# Patient Record
Sex: MALE | Race: WHITE | NOT HISPANIC OR LATINO | Employment: OTHER | URBAN - METROPOLITAN AREA
[De-identification: names, ages, dates, MRNs, and addresses within clinical notes are randomized per-mention and may not be internally consistent; named-entity substitution may affect disease eponyms.]

---

## 2023-11-27 PROBLEM — S62.212A: Status: ACTIVE | Noted: 2023-11-27

## 2024-01-06 ENCOUNTER — NURSE TRIAGE (OUTPATIENT)
Dept: ADMINISTRATIVE | Facility: CLINIC | Age: 79
End: 2024-01-06
Payer: MEDICARE

## 2024-01-07 NOTE — TELEPHONE ENCOUNTER
Reason for Disposition   Severe pain in the incision    Additional Information   Negative: [1] Major abdominal surgical incision AND [2] wound gaping open AND [3] visible internal organs   Negative: Sounds like a life-threatening emergency to the triager   Negative: [1] Bleeding from incision AND [2] won't stop after 10 minutes of direct pressure   Negative: [1] Widespread rash AND [2] bright red, sunburn-like    Protocols used: Post-Op Incision Symptoms-A-AH  12/12 surg at [facility]  Broke thumb 11/24 . hx PD. Caller states pt has vivid dreams- had dream earlier this am and hit hand. Hand in molded splint. Pins were exposed. Pins now pushed in and in quite bit of pain. had hydrocodone and not helping with pain. appt on wed. pain level 12. Pt then states now not hurting - caller states pt having shooting pain. Rec ED. Caller agrees.

## 2024-06-12 PROBLEM — G20.A2 PARKINSON'S DISEASE WITH FLUCTUATING MANIFESTATIONS: Status: ACTIVE | Noted: 2024-06-12

## 2024-06-12 PROBLEM — M75.41 ROTATOR CUFF IMPINGEMENT SYNDROME OF RIGHT SHOULDER: Status: ACTIVE | Noted: 2024-06-12

## 2024-06-12 PROBLEM — M75.111 NONTRAUMATIC INCOMPLETE TEAR OF RIGHT ROTATOR CUFF: Status: ACTIVE | Noted: 2024-06-12
